# Patient Record
Sex: FEMALE | Race: WHITE | NOT HISPANIC OR LATINO | Employment: FULL TIME | URBAN - METROPOLITAN AREA
[De-identification: names, ages, dates, MRNs, and addresses within clinical notes are randomized per-mention and may not be internally consistent; named-entity substitution may affect disease eponyms.]

---

## 2021-01-27 ENCOUNTER — HISTORICAL (OUTPATIENT)
Dept: LAB | Facility: HOSPITAL | Age: 66
End: 2021-01-27

## 2022-04-07 ENCOUNTER — HISTORICAL (OUTPATIENT)
Dept: ADMINISTRATIVE | Facility: HOSPITAL | Age: 67
End: 2022-04-07
Payer: COMMERCIAL

## 2022-04-23 VITALS
DIASTOLIC BLOOD PRESSURE: 90 MMHG | OXYGEN SATURATION: 98 % | WEIGHT: 192.69 LBS | HEIGHT: 64 IN | BODY MASS INDEX: 32.9 KG/M2 | SYSTOLIC BLOOD PRESSURE: 138 MMHG

## 2022-05-11 ENCOUNTER — TELEPHONE (OUTPATIENT)
Dept: FAMILY MEDICINE | Facility: CLINIC | Age: 67
End: 2022-05-11
Payer: COMMERCIAL

## 2022-05-11 NOTE — TELEPHONE ENCOUNTER
----- Message from Evin Koroma sent at 5/10/2022  8:19 AM CDT -----  .Type:  Needs Medical Advice    Who Called: Shayy  Symptoms (please be specific):    How long has patient had these symptoms:    Pharmacy name and phone #:    Would the patient rather a call back or a response via [facility]?   Best Call Back Number: [phone]  Additional Information: she had a question about her blood work results, please call her to discuss.

## 2022-05-24 NOTE — TELEPHONE ENCOUNTER
Pt had labs on 4/8/22. Pulled them from [facility] and scanning them into chart for review. Will call pt with results after you review. Please send when done. Thanks.

## 2022-06-03 RX ORDER — METFORMIN HYDROCHLORIDE 500 MG/1
TABLET ORAL
Qty: 180 TABLET | Refills: 0 | Status: SHIPPED | OUTPATIENT
Start: 2022-06-03 | End: 2022-06-07

## 2022-06-07 RX ORDER — METFORMIN HYDROCHLORIDE 500 MG/1
TABLET ORAL
Qty: 180 TABLET | Refills: 0 | Status: SHIPPED | OUTPATIENT
Start: 2022-06-07 | End: 2022-12-29 | Stop reason: SDUPTHER

## 2022-06-09 ENCOUNTER — TELEPHONE (OUTPATIENT)
Dept: FAMILY MEDICINE | Facility: CLINIC | Age: 67
End: 2022-06-09
Payer: COMMERCIAL

## 2022-06-09 NOTE — TELEPHONE ENCOUNTER
Called and notified pt of results being normal. Left message to return call if any questions/concerns.

## 2022-06-09 NOTE — TELEPHONE ENCOUNTER
----- Message from Peggy Petra sent at 6/7/2022  9:08 AM CDT -----  Regarding: Test Results  Type:  Test Results    Who Called: Patient  Name of Test (Lab/Mammo/Etc): Labs  Date of Test: 04/08/22  Ordering Provider: Juan Luis  Where the test was performed: [facility]  Would the patient rather a call back or a response via [facility]? Call  Best Call Back Number: [phone]  Additional Information:  Patient states she has been waiting for the results over a month and would like a call to discuss results at number listed above.

## 2022-06-29 LAB
LEFT EYE DM RETINOPATHY: NEGATIVE
RIGHT EYE DM RETINOPATHY: NEGATIVE

## 2022-07-27 ENCOUNTER — TELEPHONE (OUTPATIENT)
Dept: FAMILY MEDICINE | Facility: CLINIC | Age: 67
End: 2022-07-27

## 2022-07-27 NOTE — TELEPHONE ENCOUNTER
----- Message from Srikanth Torres sent at 7/27/2022 12:50 PM CDT -----  Regarding: Appointment  Type:  Patient Call    Who Called: pt  Who Left Message for Patient: nurse  Does the patient know what this is regarding?: y  Would the patient rather a call back or a response via [facility]?  na  Best Call Back Number: [phone]  Additional Information:  requesting to cancel appointment at 330 due to death in family, rescheduled appointment for tomorrow, same time.

## 2022-07-28 ENCOUNTER — OFFICE VISIT (OUTPATIENT)
Dept: FAMILY MEDICINE | Facility: CLINIC | Age: 67
End: 2022-07-28
Payer: COMMERCIAL

## 2022-07-28 VITALS
OXYGEN SATURATION: 98 % | SYSTOLIC BLOOD PRESSURE: 121 MMHG | HEIGHT: 65 IN | TEMPERATURE: 98 F | HEART RATE: 79 BPM | BODY MASS INDEX: 29.66 KG/M2 | DIASTOLIC BLOOD PRESSURE: 82 MMHG | WEIGHT: 178 LBS | RESPIRATION RATE: 18 BRPM

## 2022-07-28 DIAGNOSIS — Z11.59 NEED FOR HEPATITIS C SCREENING TEST: ICD-10-CM

## 2022-07-28 DIAGNOSIS — E78.2 MIXED HYPERLIPIDEMIA: ICD-10-CM

## 2022-07-28 DIAGNOSIS — I10 PRIMARY HYPERTENSION: ICD-10-CM

## 2022-07-28 DIAGNOSIS — E11.65 TYPE 2 DIABETES MELLITUS WITH HYPERGLYCEMIA, WITHOUT LONG-TERM CURRENT USE OF INSULIN: ICD-10-CM

## 2022-07-28 DIAGNOSIS — R79.89 ELEVATED LIVER FUNCTION TESTS: ICD-10-CM

## 2022-07-28 DIAGNOSIS — Z01.818 PRE-OP EXAM: Primary | ICD-10-CM

## 2022-07-28 PROBLEM — Z97.3 WEARS EYEGLASSES: Status: ACTIVE | Noted: 2022-07-28

## 2022-07-28 PROBLEM — Z00.00 MEDICARE ANNUAL WELLNESS VISIT, SUBSEQUENT: Status: ACTIVE | Noted: 2022-07-28

## 2022-07-28 PROBLEM — E66.9 OBESITY: Status: ACTIVE | Noted: 2022-07-28

## 2022-07-28 PROBLEM — Z28.20 IMMUNIZATION NOT CARRIED OUT BECAUSE OF PATIENT DECISION: Status: ACTIVE | Noted: 2022-07-28

## 2022-07-28 PROBLEM — L40.9 PSORIASIS: Status: ACTIVE | Noted: 2022-07-28

## 2022-07-28 PROBLEM — Z53.20 OSTEOPOROSIS SCREENING DECLINED: Status: ACTIVE | Noted: 2022-07-28

## 2022-07-28 PROBLEM — F41.1 GENERALIZED ANXIETY DISORDER: Status: ACTIVE | Noted: 2022-07-28

## 2022-07-28 PROBLEM — Z23 ENCOUNTER FOR VACCINATION: Status: ACTIVE | Noted: 2022-07-28

## 2022-07-28 PROBLEM — E66.9 OBESITY WITH BODY MASS INDEX 30 OR GREATER: Status: ACTIVE | Noted: 2022-07-28

## 2022-07-28 PROCEDURE — 99214 OFFICE O/P EST MOD 30 MIN: CPT | Mod: ,,, | Performed by: FAMILY MEDICINE

## 2022-07-28 PROCEDURE — 99214 PR OFFICE/OUTPT VISIT, EST, LEVL IV, 30-39 MIN: ICD-10-PCS | Mod: ,,, | Performed by: FAMILY MEDICINE

## 2022-07-28 RX ORDER — ESTRADIOL 0.1 MG/G
CREAM VAGINAL
COMMUNITY
Start: 2022-03-21

## 2022-07-28 RX ORDER — IXEKIZUMAB 80 MG/ML
80 INJECTION, SOLUTION SUBCUTANEOUS
COMMUNITY
Start: 2022-07-15

## 2022-07-28 RX ORDER — SIMVASTATIN 20 MG/1
20 TABLET, FILM COATED ORAL NIGHTLY
COMMUNITY
Start: 2022-05-17 | End: 2023-03-29 | Stop reason: SDUPTHER

## 2022-07-28 RX ORDER — AMOXICILLIN 500 MG
1 CAPSULE ORAL DAILY
COMMUNITY

## 2022-07-28 RX ORDER — LORATADINE 10 MG/1
10 TABLET ORAL DAILY
COMMUNITY

## 2022-07-28 RX ORDER — METHYLPREDNISOLONE 4 MG/1
4 TABLET ORAL DAILY
COMMUNITY
End: 2023-03-29 | Stop reason: ALTCHOICE

## 2022-07-28 RX ORDER — MULTIVITAMIN
1 TABLET ORAL DAILY
COMMUNITY

## 2022-07-28 RX ORDER — LISINOPRIL 40 MG/1
40 TABLET ORAL DAILY
COMMUNITY
Start: 2022-05-15 | End: 2023-03-29 | Stop reason: SDUPTHER

## 2022-07-28 NOTE — PROGRESS NOTES
Subjective:      Patient ID: Shayy Burns is a 67 y.o. female.    Chief Complaint: Follow-up (Pt here for sx clearance for plastic surgery for face lift on 8/31/22. Appt with sx 8/16/22 and will need to have all pre-op orders done by then. )    Here for pre-operative medical clearance exam. Denies acute complaints.    Facelift is planned for mid August (~2-3 weeks). Pre-op labs and other screening requirements to be ordered by surgeon (as I understand it).      Problem List Items Addressed This Visit     Elevated liver function tests    Relevant Orders    Comprehensive Metabolic Panel    Hypertension    Relevant Orders    CBC Auto Differential    Comprehensive Metabolic Panel    Lipid Panel    Urinalysis, Reflex to Urine Culture Urine, Clean Catch    Hemoglobin A1C    MICROALBUMIN / CREATININE RATIO URINE    Mixed hyperlipidemia    Relevant Orders    Comprehensive Metabolic Panel    Lipid Panel    Type 2 diabetes mellitus with hyperglycemia    Relevant Orders    CBC Auto Differential    Comprehensive Metabolic Panel    Lipid Panel    Urinalysis, Reflex to Urine Culture Urine, Clean Catch    Hemoglobin A1C    MICROALBUMIN / CREATININE RATIO URINE      Other Visit Diagnoses     Pre-op exam    -  Primary    Need for hepatitis C screening test        Relevant Orders    Hepatitis C Antibody          The patient's Health Maintenance was reviewed and the following appears to be due:   Health Maintenance Due   Topic Date Due    Hepatitis C Screening  Never done    Lipid Panel  Never done    TETANUS VACCINE  Never done    Mammogram  Never done    DEXA Scan  Never done    Colorectal Cancer Screening  Never done    Shingles Vaccine (1 of 2) Never done    Pneumococcal Vaccines (Age 65+) (1 - PCV) Never done    COVID-19 Vaccine (3 - Booster for Pfizer series) 03/08/2022       Past Medical History:  Past Medical History:   Diagnosis Date    Allergy     Diabetes mellitus, type 2     Hyperlipidemia     Hypertension   "   Psoriasis      Past Surgical History:   Procedure Laterality Date     SECTION      HYSTERECTOMY       Review of patient's allergies indicates:   Allergen Reactions    Bupropion hcl Rash     Current Outpatient Medications on File Prior to Visit   Medication Sig Dispense Refill    estradioL (ESTRACE) 0.01 % (0.1 mg/gram) vaginal cream INSERT 1 GRAM VAGINALLY TWICE A WEEK      lisinopriL (PRINIVIL,ZESTRIL) 40 MG tablet Take 40 mg by mouth once daily.      loratadine (CLARITIN) 10 mg tablet Take 10 mg by mouth once daily.      metFORMIN (GLUCOPHAGE) 500 MG tablet Take 1 tablet by mouth twice daily 180 tablet 0    methylPREDNISolone (MEDROL DOSEPACK) 4 mg tablet Take 4 mg by mouth once daily. use as directed      multivitamin (ONE DAILY MULTIVITAMIN) per tablet Take 1 tablet by mouth once daily.      omega-3 fatty acids/fish oil (FISH OIL-OMEGA-3 FATTY ACIDS) 300-1,000 mg capsule Take 1 capsule by mouth once daily.      simvastatin (ZOCOR) 20 MG tablet Take 20 mg by mouth nightly.      TALTZ AUTOINJECTOR 80 mg/mL AtIn Inject 80 mg into the skin every 28 days.       No current facility-administered medications on file prior to visit.     Social History     Socioeconomic History    Marital status:     Number of children: 2   Occupational History    Occupation:     Tobacco Use    Smoking status: Never Smoker    Smokeless tobacco: Never Used   Substance and Sexual Activity    Alcohol use: Yes     Comment: 5-7 drinks per week     Drug use: Never     Family History   Problem Relation Age of Onset    Cancer Mother     Heart disease Mother     Parkinsonism Father        Review of Systems   All other systems reviewed and are negative.      Objective:   /82 (BP Location: Right arm, Patient Position: Sitting, BP Method: Large (Automatic))   Pulse 79   Temp 97.9 °F (36.6 °C) (Temporal)   Resp 18   Ht 5' 5" (1.651 m)   Wt 80.7 kg (178 lb)   SpO2 98%   BMI 29.62 kg/m² "     Physical Exam  Vitals and nursing note reviewed.   Constitutional:       Appearance: Normal appearance. She is overweight.   HENT:      Head: Normocephalic and atraumatic.      Right Ear: Tympanic membrane, ear canal and external ear normal.      Left Ear: Tympanic membrane, ear canal and external ear normal.      Nose: Nose normal.      Mouth/Throat:      Mouth: Mucous membranes are moist.      Pharynx: Oropharynx is clear.   Eyes:      Extraocular Movements: Extraocular movements intact.      Conjunctiva/sclera: Conjunctivae normal.      Pupils: Pupils are equal, round, and reactive to light.   Cardiovascular:      Rate and Rhythm: Normal rate and regular rhythm.      Pulses: Normal pulses.      Heart sounds: Normal heart sounds.   Pulmonary:      Effort: Pulmonary effort is normal.      Breath sounds: Normal breath sounds.   Abdominal:      General: Abdomen is flat. Bowel sounds are normal.      Palpations: Abdomen is soft.   Musculoskeletal:         General: Normal range of motion.      Cervical back: Normal range of motion and neck supple.   Skin:     General: Skin is warm and dry.      Capillary Refill: Capillary refill takes 2 to 3 seconds.   Neurological:      General: No focal deficit present.      Mental Status: She is alert and oriented to person, place, and time. Mental status is at baseline.   Psychiatric:         Mood and Affect: Mood normal.         Behavior: Behavior normal.         Thought Content: Thought content normal.         Judgment: Judgment normal.         No visits with results within 6 Month(s) from this visit.   Latest known visit with results is:   No results found for any previous visit.       No image results found.       Assessment:     1. Pre-op exam    2. Type 2 diabetes mellitus with hyperglycemia, without long-term current use of insulin    3. Primary hypertension    4. Mixed hyperlipidemia    5. Elevated liver function tests    6. Need for hepatitis C screening test       Plan:   I am having Shayy Burns maintain her metFORMIN, lisinopriL, simvastatin, estradioL, TALTZ AUTOINJECTOR, loratadine, multivitamin, fish oil-omega-3 fatty acids, and methylPREDNISolone.  Problem List Items Addressed This Visit     Elevated liver function tests    Relevant Orders    Comprehensive Metabolic Panel    Hypertension    Relevant Orders    CBC Auto Differential    Comprehensive Metabolic Panel    Lipid Panel    Urinalysis, Reflex to Urine Culture Urine, Clean Catch    Hemoglobin A1C    MICROALBUMIN / CREATININE RATIO URINE    Mixed hyperlipidemia    Relevant Orders    Comprehensive Metabolic Panel    Lipid Panel    Type 2 diabetes mellitus with hyperglycemia    Relevant Orders    CBC Auto Differential    Comprehensive Metabolic Panel    Lipid Panel    Urinalysis, Reflex to Urine Culture Urine, Clean Catch    Hemoglobin A1C    MICROALBUMIN / CREATININE RATIO URINE      Other Visit Diagnoses     Pre-op exam    -  Primary    Need for hepatitis C screening test        Relevant Orders    Hepatitis C Antibody        Follow up for mammogram report request; DXA request & pap report (Dr. Hunter); colon cancer screen results.    Shayy was seen today for follow-up.    Diagnoses and all orders for this visit:    Pre-op exam    Regarding the purpose for this visit, She is medically stable and cleared to proceed with planned operation without impediment. (Plastic Surgery - face).        The Follow-ing diagnoses are here placed to effect labs for the upcoming surveillance visit in September. Orders were not carried over from [facility], and so are being placed here to better prepare for that visit.    Type 2 diabetes mellitus with hyperglycemia, without long-term current use of insulin  -     CBC Auto Differential; Future  -     Comprehensive Metabolic Panel; Future  -     Lipid Panel; Future  -     Urinalysis, Reflex to Urine Culture Urine, Clean Catch; Future  -     Hemoglobin A1C; Future  -     MICROALBUMIN /  CREATININE RATIO URINE; Future  -     CBC Auto Differential  -     Comprehensive Metabolic Panel  -     Lipid Panel  -     Urinalysis, Reflex to Urine Culture Urine, Clean Catch  -     Hemoglobin A1C  -     MICROALBUMIN / CREATININE RATIO URINE    Primary hypertension  -     CBC Auto Differential; Future  -     Comprehensive Metabolic Panel; Future  -     Lipid Panel; Future  -     Urinalysis, Reflex to Urine Culture Urine, Clean Catch; Future  -     Hemoglobin A1C; Future  -     MICROALBUMIN / CREATININE RATIO URINE; Future  -     CBC Auto Differential  -     Comprehensive Metabolic Panel  -     Lipid Panel  -     Urinalysis, Reflex to Urine Culture Urine, Clean Catch  -     Hemoglobin A1C  -     MICROALBUMIN / CREATININE RATIO URINE    Mixed hyperlipidemia  -     Comprehensive Metabolic Panel; Future  -     Lipid Panel; Future  -     Comprehensive Metabolic Panel  -     Lipid Panel    Elevated liver function tests  -     Comprehensive Metabolic Panel; Future  -     Comprehensive Metabolic Panel    Need for hepatitis C screening test  -     Hepatitis C Antibody; Future  -     Hepatitis C Antibody         [unfilled]  Orders Placed This Encounter   Procedures    CBC Auto Differential     Standing Status:   Future     Number of Occurrences:   1     Standing Expiration Date:   11/1/2022    Comprehensive Metabolic Panel     Standing Status:   Future     Number of Occurrences:   1     Standing Expiration Date:   11/1/2022    Lipid Panel     Standing Status:   Future     Number of Occurrences:   1     Standing Expiration Date:   11/1/2022    Urinalysis, Reflex to Urine Culture Urine, Clean Catch     Standing Status:   Future     Number of Occurrences:   1     Standing Expiration Date:   11/1/2022     Order Specific Question:   Preferred Collection Type     Answer:   Urine, Clean Catch     Order Specific Question:   Specimen Source     Answer:   Urine    Hemoglobin A1C     Standing Status:   Future     Number of  Occurrences:   1     Standing Expiration Date:   11/1/2022    MICROALBUMIN / CREATININE RATIO URINE     Standing Status:   Future     Number of Occurrences:   1     Standing Expiration Date:   11/1/2022     Order Specific Question:   Specimen Source     Answer:   Urine    Hepatitis C Antibody     Standing Status:   Future     Number of Occurrences:   1     Standing Expiration Date:   11/1/2022     Order Specific Question:   Release to patient     Answer:   Immediate       Medication List with Changes/Refills   Current Medications    ESTRADIOL (ESTRACE) 0.01 % (0.1 MG/GRAM) VAGINAL CREAM    INSERT 1 GRAM VAGINALLY TWICE A WEEK    LISINOPRIL (PRINIVIL,ZESTRIL) 40 MG TABLET    Take 40 mg by mouth once daily.    LORATADINE (CLARITIN) 10 MG TABLET    Take 10 mg by mouth once daily.    METFORMIN (GLUCOPHAGE) 500 MG TABLET    Take 1 tablet by mouth twice daily    METHYLPREDNISOLONE (MEDROL DOSEPACK) 4 MG TABLET    Take 4 mg by mouth once daily. use as directed    MULTIVITAMIN (ONE DAILY MULTIVITAMIN) PER TABLET    Take 1 tablet by mouth once daily.    OMEGA-3 FATTY ACIDS/FISH OIL (FISH OIL-OMEGA-3 FATTY ACIDS) 300-1,000 MG CAPSULE    Take 1 capsule by mouth once daily.    SIMVASTATIN (ZOCOR) 20 MG TABLET    Take 20 mg by mouth nightly.    TALTZ AUTOINJECTOR 80 MG/ML ATIN    Inject 80 mg into the skin every 28 days.      Medication List with Changes/Refills   Current Medications    ESTRADIOL (ESTRACE) 0.01 % (0.1 MG/GRAM) VAGINAL CREAM    INSERT 1 GRAM VAGINALLY TWICE A WEEK       Start Date: 3/21/2022 End Date: --    LISINOPRIL (PRINIVIL,ZESTRIL) 40 MG TABLET    Take 40 mg by mouth once daily.       Start Date: 5/15/2022 End Date: --    LORATADINE (CLARITIN) 10 MG TABLET    Take 10 mg by mouth once daily.       Start Date: --        End Date: --    METFORMIN (GLUCOPHAGE) 500 MG TABLET    Take 1 tablet by mouth twice daily       Start Date: 6/7/2022  End Date: --    METHYLPREDNISOLONE (MEDROL DOSEPACK) 4 MG TABLET    Take  4 mg by mouth once daily. use as directed       Start Date: --        End Date: --    MULTIVITAMIN (ONE DAILY MULTIVITAMIN) PER TABLET    Take 1 tablet by mouth once daily.       Start Date: --        End Date: --    OMEGA-3 FATTY ACIDS/FISH OIL (FISH OIL-OMEGA-3 FATTY ACIDS) 300-1,000 MG CAPSULE    Take 1 capsule by mouth once daily.       Start Date: --        End Date: --    SIMVASTATIN (ZOCOR) 20 MG TABLET    Take 20 mg by mouth nightly.       Start Date: 5/17/2022 End Date: --    TALTZ AUTOINJECTOR 80 MG/ML ATIN    Inject 80 mg into the skin every 28 days.       Start Date: 7/15/2022 End Date: --

## 2022-08-02 ENCOUNTER — DOCUMENTATION ONLY (OUTPATIENT)
Dept: ADMINISTRATIVE | Facility: HOSPITAL | Age: 67
End: 2022-08-02
Payer: COMMERCIAL

## 2022-08-08 ENCOUNTER — CLINICAL SUPPORT (OUTPATIENT)
Dept: RESPIRATORY THERAPY | Facility: HOSPITAL | Age: 67
End: 2022-08-08
Attending: FAMILY MEDICINE
Payer: COMMERCIAL

## 2022-08-08 DIAGNOSIS — I10 PRIMARY HYPERTENSION: ICD-10-CM

## 2022-08-08 DIAGNOSIS — Z01.818 PRE-OP EXAM: ICD-10-CM

## 2022-08-08 PROCEDURE — 93005 ELECTROCARDIOGRAM TRACING: CPT

## 2022-08-08 PROCEDURE — 93010 EKG 12-LEAD: ICD-10-PCS | Mod: ,,, | Performed by: INTERNAL MEDICINE

## 2022-08-08 PROCEDURE — 93010 ELECTROCARDIOGRAM REPORT: CPT | Mod: ,,, | Performed by: INTERNAL MEDICINE

## 2022-09-15 LAB
CHOLEST SERPL-MSCNC: 134 MG/DL (ref 0–200)
HBA1C MFR BLD: 6.2 % (ref 4–6)
HDLC SERPL-MCNC: 46 MG/DL (ref 35–70)
LDLC SERPL CALC-MCNC: 66 MG/DL
TRIGL SERPL-MCNC: 138 MG/DL (ref 40–160)

## 2022-09-22 ENCOUNTER — DOCUMENTATION ONLY (OUTPATIENT)
Dept: FAMILY MEDICINE | Facility: CLINIC | Age: 67
End: 2022-09-22
Payer: COMMERCIAL

## 2022-09-23 ENCOUNTER — DOCUMENTATION ONLY (OUTPATIENT)
Dept: FAMILY MEDICINE | Facility: CLINIC | Age: 67
End: 2022-09-23
Payer: COMMERCIAL

## 2022-09-26 ENCOUNTER — OFFICE VISIT (OUTPATIENT)
Dept: FAMILY MEDICINE | Facility: CLINIC | Age: 67
End: 2022-09-26
Payer: COMMERCIAL

## 2022-09-26 VITALS
HEIGHT: 65 IN | HEART RATE: 85 BPM | DIASTOLIC BLOOD PRESSURE: 76 MMHG | WEIGHT: 174.81 LBS | RESPIRATION RATE: 18 BRPM | BODY MASS INDEX: 29.12 KG/M2 | SYSTOLIC BLOOD PRESSURE: 129 MMHG

## 2022-09-26 DIAGNOSIS — Z23 NEED FOR VACCINATION: ICD-10-CM

## 2022-09-26 DIAGNOSIS — E66.3 OVERWEIGHT: Chronic | ICD-10-CM

## 2022-09-26 DIAGNOSIS — E78.2 MIXED HYPERLIPIDEMIA DUE TO TYPE 2 DIABETES MELLITUS: Chronic | ICD-10-CM

## 2022-09-26 DIAGNOSIS — E78.2 MIXED HYPERLIPIDEMIA: Chronic | ICD-10-CM

## 2022-09-26 DIAGNOSIS — Z13.820 OSTEOPOROSIS SCREENING: Chronic | ICD-10-CM

## 2022-09-26 DIAGNOSIS — E11.59 HYPERTENSION ASSOCIATED WITH TYPE 2 DIABETES MELLITUS: Chronic | ICD-10-CM

## 2022-09-26 DIAGNOSIS — I10 PRIMARY HYPERTENSION: Primary | Chronic | ICD-10-CM

## 2022-09-26 DIAGNOSIS — Z12.11 COLON CANCER SCREENING: Chronic | ICD-10-CM

## 2022-09-26 DIAGNOSIS — Z12.31 ENCOUNTER FOR SCREENING MAMMOGRAM FOR MALIGNANT NEOPLASM OF BREAST: Chronic | ICD-10-CM

## 2022-09-26 DIAGNOSIS — Z28.20 IMMUNIZATION NOT CARRIED OUT BECAUSE OF PATIENT DECISION: ICD-10-CM

## 2022-09-26 DIAGNOSIS — E11.69 MIXED HYPERLIPIDEMIA DUE TO TYPE 2 DIABETES MELLITUS: Chronic | ICD-10-CM

## 2022-09-26 DIAGNOSIS — E11.65 TYPE 2 DIABETES MELLITUS WITH HYPERGLYCEMIA, WITHOUT LONG-TERM CURRENT USE OF INSULIN: ICD-10-CM

## 2022-09-26 DIAGNOSIS — I15.2 HYPERTENSION ASSOCIATED WITH TYPE 2 DIABETES MELLITUS: Chronic | ICD-10-CM

## 2022-09-26 PROBLEM — Z12.39 BREAST CANCER SCREENING: Chronic | Status: ACTIVE | Noted: 2022-09-26

## 2022-09-26 PROCEDURE — 99214 OFFICE O/P EST MOD 30 MIN: CPT | Mod: ,,, | Performed by: FAMILY MEDICINE

## 2022-09-26 PROCEDURE — 99214 PR OFFICE/OUTPT VISIT, EST, LEVL IV, 30-39 MIN: ICD-10-PCS | Mod: ,,, | Performed by: FAMILY MEDICINE

## 2022-09-26 NOTE — PROGRESS NOTES
Subjective:      Patient ID: Shayy Burns is a 67 y.o. female.    Chief Complaint: Follow-up (6 month f/u/)    6 month surveillance visit without acute complaints.    Chronic conditions as discussed below      Problem List Items Addressed This Visit       Hypertension - Primary (Chronic)    Mixed hyperlipidemia (Chronic)    BMI 29.0-29.9,adult (Chronic)    Overweight (Chronic)    Type 2 diabetes mellitus with hyperglycemia (Chronic)    Hypertension associated with type 2 diabetes mellitus (Chronic)    Mixed hyperlipidemia due to type 2 diabetes mellitus (Chronic)    Breast cancer screening (Chronic)    Osteoporosis screening (Chronic)    Colon cancer screening (Chronic)    Relevant Orders    Cologuard Screening (Multitarget Stool DNA)    Need for vaccination    Immunization not carried out because of patient decision       The patient's Health Maintenance was reviewed and the following appears to be due:   Health Maintenance Due   Topic Date Due    Hepatitis C Screening  Never done    Lipid Panel  Never done    TETANUS VACCINE  Never done    Mammogram  Never done    DEXA Scan  Never done    Colorectal Cancer Screening  Never done    Shingles Vaccine (1 of 2) Never done    Pneumococcal Vaccines (Age 65+) (1 - PCV) Never done    COVID-19 Vaccine (3 - Booster for Pfizer series) 2021    Influenza Vaccine (1) Never done       Past Medical History:  Past Medical History:   Diagnosis Date    Allergy     Diabetes mellitus, type 2     Hyperlipidemia     Hypertension     Psoriasis      Past Surgical History:   Procedure Laterality Date     SECTION      HYSTERECTOMY       Review of patient's allergies indicates:   Allergen Reactions    Bupropion hcl Rash     Current Outpatient Medications on File Prior to Visit   Medication Sig Dispense Refill    estradioL (ESTRACE) 0.01 % (0.1 mg/gram) vaginal cream INSERT 1 GRAM VAGINALLY TWICE A WEEK      lisinopriL (PRINIVIL,ZESTRIL) 40 MG tablet Take 40 mg by mouth once  "daily.      loratadine (CLARITIN) 10 mg tablet Take 10 mg by mouth once daily.      metFORMIN (GLUCOPHAGE) 500 MG tablet Take 1 tablet by mouth twice daily 180 tablet 0    methylPREDNISolone (MEDROL DOSEPACK) 4 mg tablet Take 4 mg by mouth once daily. use as directed      multivitamin (THERAGRAN) per tablet Take 1 tablet by mouth once daily.      omega-3 fatty acids/fish oil (FISH OIL-OMEGA-3 FATTY ACIDS) 300-1,000 mg capsule Take 1 capsule by mouth once daily.      simvastatin (ZOCOR) 20 MG tablet Take 20 mg by mouth nightly.      TALTZ AUTOINJECTOR 80 mg/mL AtIn Inject 80 mg into the skin every 28 days.       No current facility-administered medications on file prior to visit.     Social History     Socioeconomic History    Marital status:     Number of children: 2   Occupational History    Occupation:     Tobacco Use    Smoking status: Never    Smokeless tobacco: Never   Substance and Sexual Activity    Alcohol use: Yes     Comment: 5-7 drinks per week     Drug use: Never    Sexual activity: Never     Family History   Problem Relation Age of Onset    Cancer Mother     Heart disease Mother     Parkinsonism Father        Review of Systems   All other systems reviewed and are negative.    Objective:   /76 (BP Location: Right arm, Patient Position: Sitting, BP Method: Small (Automatic))   Pulse 85   Resp 18   Ht 5' 5" (1.651 m)   Wt 79.3 kg (174 lb 12.8 oz)   BMI 29.09 kg/m²     Physical Exam  Vitals and nursing note reviewed.   Constitutional:       General: She is awake.      Appearance: Normal appearance. She is well-developed, well-groomed and overweight.   HENT:      Head: Normocephalic and atraumatic.      Right Ear: Tympanic membrane, ear canal and external ear normal.      Left Ear: Tympanic membrane, ear canal and external ear normal.      Nose: Nose normal.      Mouth/Throat:      Mouth: Mucous membranes are moist.      Pharynx: Oropharynx is clear.   Eyes:      Extraocular " Movements: Extraocular movements intact.      Conjunctiva/sclera: Conjunctivae normal.      Pupils: Pupils are equal, round, and reactive to light.   Cardiovascular:      Rate and Rhythm: Normal rate and regular rhythm.      Pulses: Normal pulses.      Heart sounds: Normal heart sounds.   Pulmonary:      Effort: Pulmonary effort is normal.      Breath sounds: Normal breath sounds.   Abdominal:      General: Abdomen is flat. Bowel sounds are normal.      Palpations: Abdomen is soft.   Musculoskeletal:         General: Normal range of motion.      Cervical back: Normal range of motion and neck supple.   Skin:     General: Skin is warm and dry.      Capillary Refill: Capillary refill takes less than 2 seconds.   Neurological:      General: No focal deficit present.      Mental Status: She is alert and oriented to person, place, and time. Mental status is at baseline.   Psychiatric:         Mood and Affect: Mood normal.         Behavior: Behavior normal. Behavior is cooperative.         Thought Content: Thought content normal.         Judgment: Judgment normal.       Documentation Only on 08/02/2022   Component Date Value Ref Range Status    Left Eye DM Retinopathy 06/29/2022 Negative   Final    Right Eye DM Retinopathy 06/29/2022 Negative   Final       No image results found.       Assessment:     1. Primary hypertension    2. Mixed hyperlipidemia    3. Type 2 diabetes mellitus with hyperglycemia, without long-term current use of insulin    4. Hypertension associated with type 2 diabetes mellitus    5. Mixed hyperlipidemia due to type 2 diabetes mellitus    6. Need for vaccination    7. Immunization not carried out because of patient decision    8. BMI 29.0-29.9,adult    9. Overweight    10. Encounter for screening mammogram for malignant neoplasm of breast    11. Osteoporosis screening    12. Colon cancer screening      Plan:   I am having Shayy Burns maintain her metFORMIN, lisinopriL, simvastatin, estradioL, TALTZ  AUTOINJECTOR, loratadine, multivitamin, fish oil-omega-3 fatty acids, and methylPREDNISolone.  Problem List Items Addressed This Visit       Hypertension - Primary (Chronic)    Mixed hyperlipidemia (Chronic)    BMI 29.0-29.9,adult (Chronic)    Overweight (Chronic)    Type 2 diabetes mellitus with hyperglycemia (Chronic)    Hypertension associated with type 2 diabetes mellitus (Chronic)    Mixed hyperlipidemia due to type 2 diabetes mellitus (Chronic)    Breast cancer screening (Chronic)    Osteoporosis screening (Chronic)    Colon cancer screening (Chronic)    Relevant Orders    Cologuard Screening (Multitarget Stool DNA)    Need for vaccination    Immunization not carried out because of patient decision     Follow up in about 6 months (around 3/26/2023) for DEXA and Mammogram report.    Shayy was seen today for follow-up.  Labs (in media tab) from outside source reviewed in visit.   Refills will be needed soon, not exactly today  Diagnoses and all orders for this visit:    Primary hypertension   Continue current prescription medications. Refills as needed   Condition/Symptoms controlled   RTC 6 months (as scheduled) or PRN    Mixed hyperlipidemia   Continue current prescription medications. Refills as needed   Condition/Symptoms controlled   RTC 6 months (as scheduled) or PRN    Type 2 diabetes mellitus with hyperglycemia, without long-term current use of insulin   Continue current prescription medications. Refills as needed   Condition/Symptoms controlled   RTC 6 months (as scheduled) or PRN    Hypertension associated with type 2 diabetes mellitus   Continue current prescription medications. Refills as needed   Condition/Symptoms controlled   RTC 6 months (as scheduled) or PRN    Mixed hyperlipidemia due to type 2 diabetes mellitus   Continue current prescription medications. Refills as needed   Condition/Symptoms controlled   RTC 6 months (as scheduled) or PRN    Need for vaccination  Immunization not carried  out because of patient decision  Documented for chart completeness and [facility]    BMI 29.0-29.9,adult  Overweight  Documented for chart completeness and [facility]    Encounter for screening mammogram for malignant neoplasm of breast   Osteoporosis screening   Request records    Colon cancer screening  -     Cologuard Screening (Multitarget Stool DNA); Future  -     Cologuard Screening (Multitarget Stool DNA)         [unfilled]  Orders Placed This Encounter   Procedures    Cologuard Screening (Multitarget Stool DNA)     Standing Status:   Future     Number of Occurrences:   1     Standing Expiration Date:   11/25/2023       Medication List with Changes/Refills   Current Medications    ESTRADIOL (ESTRACE) 0.01 % (0.1 MG/GRAM) VAGINAL CREAM    INSERT 1 GRAM VAGINALLY TWICE A WEEK    LISINOPRIL (PRINIVIL,ZESTRIL) 40 MG TABLET    Take 40 mg by mouth once daily.    LORATADINE (CLARITIN) 10 MG TABLET    Take 10 mg by mouth once daily.    METFORMIN (GLUCOPHAGE) 500 MG TABLET    Take 1 tablet by mouth twice daily    METHYLPREDNISOLONE (MEDROL DOSEPACK) 4 MG TABLET    Take 4 mg by mouth once daily. use as directed    MULTIVITAMIN (THERAGRAN) PER TABLET    Take 1 tablet by mouth once daily.    OMEGA-3 FATTY ACIDS/FISH OIL (FISH OIL-OMEGA-3 FATTY ACIDS) 300-1,000 MG CAPSULE    Take 1 capsule by mouth once daily.    SIMVASTATIN (ZOCOR) 20 MG TABLET    Take 20 mg by mouth nightly.    TALTZ AUTOINJECTOR 80 MG/ML ATIN    Inject 80 mg into the skin every 28 days.      Medication List with Changes/Refills   Current Medications    ESTRADIOL (ESTRACE) 0.01 % (0.1 MG/GRAM) VAGINAL CREAM    INSERT 1 GRAM VAGINALLY TWICE A WEEK       Start Date: 3/21/2022 End Date: --    LISINOPRIL (PRINIVIL,ZESTRIL) 40 MG TABLET    Take 40 mg by mouth once daily.       Start Date: 5/15/2022 End Date: --    LORATADINE (CLARITIN) 10 MG TABLET    Take 10 mg by mouth once daily.       Start Date: --        End Date: --    METFORMIN (GLUCOPHAGE) 500 MG TABLET    Take 1  tablet by mouth twice daily       Start Date: 6/7/2022  End Date: --    METHYLPREDNISOLONE (MEDROL DOSEPACK) 4 MG TABLET    Take 4 mg by mouth once daily. use as directed       Start Date: --        End Date: --    MULTIVITAMIN (THERAGRAN) PER TABLET    Take 1 tablet by mouth once daily.       Start Date: --        End Date: --    OMEGA-3 FATTY ACIDS/FISH OIL (FISH OIL-OMEGA-3 FATTY ACIDS) 300-1,000 MG CAPSULE    Take 1 capsule by mouth once daily.       Start Date: --        End Date: --    SIMVASTATIN (ZOCOR) 20 MG TABLET    Take 20 mg by mouth nightly.       Start Date: 5/17/2022 End Date: --    TALTZ AUTOINJECTOR 80 MG/ML ATIN    Inject 80 mg into the skin every 28 days.       Start Date: 7/15/2022 End Date: --

## 2022-10-11 ENCOUNTER — DOCUMENTATION ONLY (OUTPATIENT)
Dept: ADMINISTRATIVE | Facility: HOSPITAL | Age: 67
End: 2022-10-11
Payer: COMMERCIAL

## 2022-10-31 PROBLEM — Z00.00 MEDICARE ANNUAL WELLNESS VISIT, SUBSEQUENT: Status: RESOLVED | Noted: 2022-07-28 | Resolved: 2022-10-31

## 2022-12-26 PROBLEM — Z13.820 OSTEOPOROSIS SCREENING: Chronic | Status: RESOLVED | Noted: 2022-09-26 | Resolved: 2022-12-26

## 2023-02-27 ENCOUNTER — TELEPHONE (OUTPATIENT)
Dept: FAMILY MEDICINE | Facility: CLINIC | Age: 68
End: 2023-02-27

## 2023-02-27 DIAGNOSIS — I10 PRIMARY HYPERTENSION: Primary | Chronic | ICD-10-CM

## 2023-02-27 DIAGNOSIS — E78.2 MIXED HYPERLIPIDEMIA: Chronic | ICD-10-CM

## 2023-02-27 DIAGNOSIS — E11.65 TYPE 2 DIABETES MELLITUS WITH HYPERGLYCEMIA, WITHOUT LONG-TERM CURRENT USE OF INSULIN: Chronic | ICD-10-CM

## 2023-02-27 NOTE — TELEPHONE ENCOUNTER
----- Message from Sofía Darden sent at 2/27/2023 10:34 AM CST -----  Regarding: Lab orders  Shayy stated she will come to office to get her lab orders.  She will be doing her labs tomorrow for her 03/29/23 appt.  Did confirm with office that tomorrow will be within the 30 day period. She is requesting(if possible) can we print them now and have ready for when she get to the office.  She will pick them up. Thanks

## 2023-03-01 ENCOUNTER — DOCUMENTATION ONLY (OUTPATIENT)
Dept: FAMILY MEDICINE | Facility: CLINIC | Age: 68
End: 2023-03-01
Payer: MEDICAID

## 2023-03-01 LAB
ALBUMIN SERPL-MCNC: 4.4 G/DL (ref 3.6–5.1)
ALBUMIN/GLOB SERPL: 2 (CALC) (ref 1–2.5)
ALP SERPL-CCNC: 42 U/L (ref 37–153)
ALT SERPL-CCNC: 28 U/L (ref 6–29)
AST SERPL-CCNC: 21 U/L (ref 10–35)
BASOPHILS # BLD AUTO: 40 CELLS/UL (ref 0–200)
BASOPHILS NFR BLD AUTO: 0.7 %
BILIRUB SERPL-MCNC: 0.9 MG/DL (ref 0.2–1.2)
BUN SERPL-MCNC: 16 MG/DL (ref 7–25)
BUN/CREAT SERPL: ABNORMAL (CALC) (ref 6–22)
CALCIUM SERPL-MCNC: 9.8 MG/DL (ref 8.6–10.4)
CHLORIDE SERPL-SCNC: 101 MMOL/L (ref 98–110)
CHOLEST SERPL-MCNC: 151 MG/DL
CHOLEST SERPL-MSCNC: 151 MG/DL (ref 0–200)
CHOLEST/HDLC SERPL: 2.6 (CALC)
CO2 SERPL-SCNC: 26 MMOL/L (ref 20–32)
CREAT SERPL-MCNC: 0.62 MG/DL (ref 0.5–1.05)
EGFR: 98 ML/MIN/1.73M2
EOSINOPHIL # BLD AUTO: 51 CELLS/UL (ref 15–500)
EOSINOPHIL NFR BLD AUTO: 0.9 %
ERYTHROCYTE [DISTWIDTH] IN BLOOD BY AUTOMATED COUNT: 12.8 % (ref 11–15)
GLOBULIN SER CALC-MCNC: 2.2 G/DL (CALC) (ref 1.9–3.7)
GLUCOSE SERPL-MCNC: 109 MG/DL (ref 65–99)
HBA1C MFR BLD: 5.8 % OF TOTAL HGB
HCT VFR BLD AUTO: 48 % (ref 35–45)
HDLC SERPL-MCNC: 58 MG/DL
HDLC SERPL-MCNC: 58 MG/DL (ref 35–70)
HGB BLD-MCNC: 15.8 G/DL (ref 11.7–15.5)
LDLC SERPL CALC-MCNC: 72 MG/DL (CALC)
LDLC SERPL CALC-MCNC: 72 MG/DL (ref 0–160)
LYMPHOCYTES # BLD AUTO: 2212 CELLS/UL (ref 850–3900)
LYMPHOCYTES NFR BLD AUTO: 38.8 %
MCH RBC QN AUTO: 29.9 PG (ref 27–33)
MCHC RBC AUTO-ENTMCNC: 32.9 G/DL (ref 32–36)
MCV RBC AUTO: 90.9 FL (ref 80–100)
MONOCYTES # BLD AUTO: 428 CELLS/UL (ref 200–950)
MONOCYTES NFR BLD AUTO: 7.5 %
NEUTROPHILS # BLD AUTO: 2970 CELLS/UL (ref 1500–7800)
NEUTROPHILS NFR BLD AUTO: 52.1 %
NONHDLC SERPL-MCNC: 93 MG/DL (CALC)
PLATELET # BLD AUTO: 197 THOUSAND/UL (ref 140–400)
PMV BLD REES-ECKER: 11.7 FL (ref 7.5–12.5)
POTASSIUM SERPL-SCNC: 4.3 MMOL/L (ref 3.5–5.3)
PROT SERPL-MCNC: 6.6 G/DL (ref 6.1–8.1)
RBC # BLD AUTO: 5.28 MILLION/UL (ref 3.8–5.1)
SODIUM SERPL-SCNC: 139 MMOL/L (ref 135–146)
TRIGL SERPL-MCNC: 127 MG/DL
TRIGL SERPL-MCNC: 127 MG/DL (ref 40–160)
WBC # BLD AUTO: 5.7 THOUSAND/UL (ref 3.8–10.8)

## 2023-03-02 NOTE — PROGRESS NOTES
Labs generally improved. Next visit seems a bit distant from these results, but can review in detail at that time -- JA

## 2023-03-11 LAB
APPEARANCE UR: CLEAR
BACTERIA #/AREA URNS HPF: ABNORMAL /HPF
BILIRUB UR QL STRIP: NEGATIVE
COLOR UR: YELLOW
GLUCOSE UR QL STRIP: NEGATIVE
HGB UR QL STRIP: NEGATIVE
HYALINE CASTS #/AREA URNS LPF: ABNORMAL /LPF
KETONES UR QL STRIP: NEGATIVE
LEUKOCYTE ESTERASE UR QL STRIP: ABNORMAL
NITRITE UR QL STRIP: NEGATIVE
PH UR STRIP: 7 [PH] (ref 5–8)
PROT UR QL STRIP: NEGATIVE
RBC #/AREA URNS HPF: ABNORMAL /HPF
SERVICE CMNT-IMP: ABNORMAL
SP GR UR STRIP: 1.01 (ref 1–1.03)
SQUAMOUS #/AREA URNS HPF: ABNORMAL /HPF
WBC #/AREA URNS HPF: ABNORMAL /HPF

## 2023-03-29 ENCOUNTER — OFFICE VISIT (OUTPATIENT)
Dept: FAMILY MEDICINE | Facility: CLINIC | Age: 68
End: 2023-03-29
Payer: MEDICARE

## 2023-03-29 VITALS
DIASTOLIC BLOOD PRESSURE: 74 MMHG | HEART RATE: 95 BPM | SYSTOLIC BLOOD PRESSURE: 118 MMHG | RESPIRATION RATE: 18 BRPM | BODY MASS INDEX: 29.09 KG/M2 | OXYGEN SATURATION: 98 % | HEIGHT: 65 IN | TEMPERATURE: 98 F

## 2023-03-29 DIAGNOSIS — I10 PRIMARY HYPERTENSION: Primary | Chronic | ICD-10-CM

## 2023-03-29 DIAGNOSIS — E11.59 HYPERTENSION ASSOCIATED WITH TYPE 2 DIABETES MELLITUS: Chronic | ICD-10-CM

## 2023-03-29 DIAGNOSIS — Z79.890 HORMONE REPLACEMENT THERAPY (HRT): ICD-10-CM

## 2023-03-29 DIAGNOSIS — E11.65 TYPE 2 DIABETES MELLITUS WITH HYPERGLYCEMIA, WITHOUT LONG-TERM CURRENT USE OF INSULIN: Chronic | ICD-10-CM

## 2023-03-29 DIAGNOSIS — E78.2 MIXED HYPERLIPIDEMIA DUE TO TYPE 2 DIABETES MELLITUS: Chronic | ICD-10-CM

## 2023-03-29 DIAGNOSIS — E78.2 MIXED HYPERLIPIDEMIA: Chronic | ICD-10-CM

## 2023-03-29 DIAGNOSIS — F41.1 GENERALIZED ANXIETY DISORDER: ICD-10-CM

## 2023-03-29 DIAGNOSIS — E11.69 MIXED HYPERLIPIDEMIA DUE TO TYPE 2 DIABETES MELLITUS: Chronic | ICD-10-CM

## 2023-03-29 DIAGNOSIS — I15.2 HYPERTENSION ASSOCIATED WITH TYPE 2 DIABETES MELLITUS: Chronic | ICD-10-CM

## 2023-03-29 PROBLEM — Z12.31 BREAST CANCER SCREENING BY MAMMOGRAM: Status: ACTIVE | Noted: 2023-03-29

## 2023-03-29 PROBLEM — F41.9 ANXIETY: Status: ACTIVE | Noted: 2023-03-29

## 2023-03-29 PROBLEM — Z53.20 MAMMOGRAM DECLINED: Status: ACTIVE | Noted: 2023-03-29

## 2023-03-29 PROBLEM — Z53.20 SCREENING FOR MALIGNANT NEOPLASM OF COLON DECLINED: Status: ACTIVE | Noted: 2023-03-29

## 2023-03-29 PROCEDURE — 1101F PT FALLS ASSESS-DOCD LE1/YR: CPT | Mod: CPTII,,, | Performed by: FAMILY MEDICINE

## 2023-03-29 PROCEDURE — 1159F PR MEDICATION LIST DOCUMENTED IN MEDICAL RECORD: ICD-10-PCS | Mod: CPTII,,, | Performed by: FAMILY MEDICINE

## 2023-03-29 PROCEDURE — 3078F PR MOST RECENT DIASTOLIC BLOOD PRESSURE < 80 MM HG: ICD-10-PCS | Mod: CPTII,,, | Performed by: FAMILY MEDICINE

## 2023-03-29 PROCEDURE — 3008F BODY MASS INDEX DOCD: CPT | Mod: CPTII,,, | Performed by: FAMILY MEDICINE

## 2023-03-29 PROCEDURE — 3044F HG A1C LEVEL LT 7.0%: CPT | Mod: CPTII,,, | Performed by: FAMILY MEDICINE

## 2023-03-29 PROCEDURE — 3074F PR MOST RECENT SYSTOLIC BLOOD PRESSURE < 130 MM HG: ICD-10-PCS | Mod: CPTII,,, | Performed by: FAMILY MEDICINE

## 2023-03-29 PROCEDURE — 3044F PR MOST RECENT HEMOGLOBIN A1C LEVEL <7.0%: ICD-10-PCS | Mod: CPTII,,, | Performed by: FAMILY MEDICINE

## 2023-03-29 PROCEDURE — 3078F DIAST BP <80 MM HG: CPT | Mod: CPTII,,, | Performed by: FAMILY MEDICINE

## 2023-03-29 PROCEDURE — 1126F AMNT PAIN NOTED NONE PRSNT: CPT | Mod: CPTII,,, | Performed by: FAMILY MEDICINE

## 2023-03-29 PROCEDURE — 3008F PR BODY MASS INDEX (BMI) DOCUMENTED: ICD-10-PCS | Mod: CPTII,,, | Performed by: FAMILY MEDICINE

## 2023-03-29 PROCEDURE — 99214 OFFICE O/P EST MOD 30 MIN: CPT | Mod: ,,, | Performed by: FAMILY MEDICINE

## 2023-03-29 PROCEDURE — 3074F SYST BP LT 130 MM HG: CPT | Mod: CPTII,,, | Performed by: FAMILY MEDICINE

## 2023-03-29 PROCEDURE — 4010F ACE/ARB THERAPY RXD/TAKEN: CPT | Mod: CPTII,,, | Performed by: FAMILY MEDICINE

## 2023-03-29 PROCEDURE — 1160F PR REVIEW ALL MEDS BY PRESCRIBER/CLIN PHARMACIST DOCUMENTED: ICD-10-PCS | Mod: CPTII,,, | Performed by: FAMILY MEDICINE

## 2023-03-29 PROCEDURE — 1126F PR PAIN SEVERITY QUANTIFIED, NO PAIN PRESENT: ICD-10-PCS | Mod: CPTII,,, | Performed by: FAMILY MEDICINE

## 2023-03-29 PROCEDURE — 1159F MED LIST DOCD IN RCRD: CPT | Mod: CPTII,,, | Performed by: FAMILY MEDICINE

## 2023-03-29 PROCEDURE — 99214 PR OFFICE/OUTPT VISIT, EST, LEVL IV, 30-39 MIN: ICD-10-PCS | Mod: ,,, | Performed by: FAMILY MEDICINE

## 2023-03-29 PROCEDURE — 1160F RVW MEDS BY RX/DR IN RCRD: CPT | Mod: CPTII,,, | Performed by: FAMILY MEDICINE

## 2023-03-29 PROCEDURE — 3288F FALL RISK ASSESSMENT DOCD: CPT | Mod: CPTII,,, | Performed by: FAMILY MEDICINE

## 2023-03-29 PROCEDURE — 4010F PR ACE/ARB THEARPY RXD/TAKEN: ICD-10-PCS | Mod: CPTII,,, | Performed by: FAMILY MEDICINE

## 2023-03-29 PROCEDURE — 1101F PR PT FALLS ASSESS DOC 0-1 FALLS W/OUT INJ PAST YR: ICD-10-PCS | Mod: CPTII,,, | Performed by: FAMILY MEDICINE

## 2023-03-29 PROCEDURE — 3288F PR FALLS RISK ASSESSMENT DOCUMENTED: ICD-10-PCS | Mod: CPTII,,, | Performed by: FAMILY MEDICINE

## 2023-03-29 RX ORDER — ALPRAZOLAM 1 MG/1
1 TABLET ORAL DAILY PRN
COMMUNITY
Start: 2023-03-25

## 2023-03-29 RX ORDER — ESCITALOPRAM OXALATE 20 MG/1
20 TABLET ORAL
COMMUNITY
Start: 2023-03-17

## 2023-03-29 RX ORDER — METFORMIN HYDROCHLORIDE 500 MG/1
500 TABLET ORAL 2 TIMES DAILY
Qty: 180 TABLET | Refills: 1 | Status: SHIPPED | OUTPATIENT
Start: 2023-03-29 | End: 2023-09-25

## 2023-03-29 RX ORDER — SIMVASTATIN 20 MG/1
20 TABLET, FILM COATED ORAL NIGHTLY
Qty: 90 TABLET | Refills: 1 | Status: SHIPPED | OUTPATIENT
Start: 2023-03-29 | End: 2023-09-25

## 2023-03-29 RX ORDER — LISINOPRIL 40 MG/1
40 TABLET ORAL DAILY
Qty: 90 TABLET | Refills: 1 | Status: SHIPPED | OUTPATIENT
Start: 2023-03-29 | End: 2023-09-25

## 2023-03-29 NOTE — PROGRESS NOTES
Subjective:      Patient ID: Shayy Burns is a 67 y.o. female.    Chief Complaint: Hypertension and Medication Refill    Patient here for DMII surveillance visit    No complaints        Problem List Items Addressed This Visit       Hypertension - Primary (Chronic)    Relevant Medications    lisinopriL (PRINIVIL,ZESTRIL) 40 MG tablet    simvastatin (ZOCOR) 20 MG tablet    Mixed hyperlipidemia (Chronic)    Relevant Medications    simvastatin (ZOCOR) 20 MG tablet    Type 2 diabetes mellitus with hyperglycemia (Chronic)    Relevant Medications    lisinopriL (PRINIVIL,ZESTRIL) 40 MG tablet    metFORMIN (GLUCOPHAGE) 500 MG tablet    simvastatin (ZOCOR) 20 MG tablet    Hypertension associated with type 2 diabetes mellitus (Chronic)    Relevant Medications    lisinopriL (PRINIVIL,ZESTRIL) 40 MG tablet    metFORMIN (GLUCOPHAGE) 500 MG tablet    simvastatin (ZOCOR) 20 MG tablet    Mixed hyperlipidemia due to type 2 diabetes mellitus (Chronic)    Relevant Medications    lisinopriL (PRINIVIL,ZESTRIL) 40 MG tablet    metFORMIN (GLUCOPHAGE) 500 MG tablet    simvastatin (ZOCOR) 20 MG tablet    Generalized anxiety disorder    Hormone replacement therapy (HRT)       The patient's Health Maintenance was reviewed and the following appears to be due:   Health Maintenance Due   Topic Date Due    Hepatitis C Screening  Never done    Diabetes Urine Screening  Never done    Foot Exam  Never done    Mammogram  Never done    DEXA Scan  Never done    Colorectal Cancer Screening  Never done       Past Medical History:  Past Medical History:   Diagnosis Date    Allergy     Diabetes mellitus, type 2     Hyperlipidemia     Hypertension     Psoriasis      Past Surgical History:   Procedure Laterality Date     SECTION      HYSTERECTOMY       Review of patient's allergies indicates:   Allergen Reactions    Bupropion hcl Rash     Current Outpatient Medications on File Prior to Visit   Medication Sig Dispense Refill    ALPRAZolam (XANAX) 1  "MG tablet Take 1 mg by mouth daily as needed.      EScitalopram oxalate (LEXAPRO) 20 MG tablet Take 20 mg by mouth.      estradioL (ESTRACE) 0.01 % (0.1 mg/gram) vaginal cream INSERT 1 GRAM VAGINALLY TWICE A WEEK      loratadine (CLARITIN) 10 mg tablet Take 10 mg by mouth once daily.      omega-3 fatty acids/fish oil (FISH OIL-OMEGA-3 FATTY ACIDS) 300-1,000 mg capsule Take 1 capsule by mouth once daily.      TALTZ AUTOINJECTOR 80 mg/mL AtIn Inject 80 mg into the skin every 28 days.      [DISCONTINUED] lisinopriL (PRINIVIL,ZESTRIL) 40 MG tablet Take 40 mg by mouth once daily.      [DISCONTINUED] metFORMIN (GLUCOPHAGE) 500 MG tablet Take 1 tablet by mouth twice daily 180 tablet 0    [DISCONTINUED] simvastatin (ZOCOR) 20 MG tablet Take 20 mg by mouth nightly.      multivitamin (THERAGRAN) per tablet Take 1 tablet by mouth once daily.      [DISCONTINUED] methylPREDNISolone (MEDROL DOSEPACK) 4 mg tablet Take 4 mg by mouth once daily. use as directed       No current facility-administered medications on file prior to visit.     Social History     Socioeconomic History    Marital status:     Number of children: 2   Occupational History    Occupation:     Tobacco Use    Smoking status: Never    Smokeless tobacco: Never   Substance and Sexual Activity    Alcohol use: Yes     Comment: 5-7 drinks per week     Drug use: Never    Sexual activity: Never     Family History   Problem Relation Age of Onset    Cancer Mother     Heart disease Mother     Parkinsonism Father        Review of Systems   All other systems reviewed and are negative.    Objective:   /74 (BP Location: Right arm, Patient Position: Sitting, BP Method: Medium (Automatic))   Pulse 95   Temp 97.6 °F (36.4 °C) (Oral)   Resp 18   Ht 5' 5" (1.651 m)   SpO2 98%   BMI 29.09 kg/m²     Physical Exam  Vitals and nursing note reviewed.   Constitutional:       Appearance: Normal appearance. She is normal weight.   HENT:      Head: " Normocephalic and atraumatic.      Right Ear: Tympanic membrane, ear canal and external ear normal.      Left Ear: Tympanic membrane, ear canal and external ear normal.      Nose: Nose normal.      Mouth/Throat:      Mouth: Mucous membranes are moist.      Pharynx: Oropharynx is clear.   Eyes:      Extraocular Movements: Extraocular movements intact.      Conjunctiva/sclera: Conjunctivae normal.      Pupils: Pupils are equal, round, and reactive to light.   Cardiovascular:      Rate and Rhythm: Normal rate and regular rhythm.      Pulses: Normal pulses.           Dorsalis pedis pulses are 2+ on the right side and 2+ on the left side.        Posterior tibial pulses are 2+ on the right side and 2+ on the left side.      Heart sounds: Normal heart sounds.   Pulmonary:      Effort: Pulmonary effort is normal.      Breath sounds: Normal breath sounds.   Abdominal:      General: Abdomen is flat. Bowel sounds are normal.      Palpations: Abdomen is soft.   Musculoskeletal:         General: Normal range of motion.      Cervical back: Normal range of motion and neck supple.      Right foot: Normal range of motion. No deformity, bunion, Charcot foot, foot drop or prominent metatarsal heads.      Left foot: Normal range of motion. No deformity, bunion, Charcot foot, foot drop or prominent metatarsal heads.   Feet:      Right foot:      Protective Sensation: 10 sites tested.  10 sites sensed.      Skin integrity: Skin integrity normal.      Toenail Condition: Right toenails are normal.      Left foot:      Protective Sensation: 10 sites tested.  10 sites sensed.      Skin integrity: Skin integrity normal.      Toenail Condition: Left toenails are normal.   Skin:     General: Skin is warm and dry.      Capillary Refill: Capillary refill takes less than 2 seconds.   Neurological:      General: No focal deficit present.      Mental Status: She is alert and oriented to person, place, and time. Mental status is at baseline.    Psychiatric:         Mood and Affect: Mood normal.         Behavior: Behavior normal.         Thought Content: Thought content normal.         Judgment: Judgment normal.   Protective Sensation (w/ 10 gram monofilament):  Right: Intact  Left: Intact    Visual Inspection:  Normal -  Bilateral    Pedal Pulses:   Right: Present  Left: Present    Posterior Tibialis Pulses:   Right:Present  Left: Present      Procedures     Orders Only on 03/10/2023   Component Date Value Ref Range Status    Color, UA 03/10/2023 YELLOW  YELLOW Final    Appearance, UA 03/10/2023 CLEAR  CLEAR Final    Specific Gravity, UA 03/10/2023 1.012  1.001 - 1.035 Final    pH, UA 03/10/2023 7.0  5.0 - 8.0 Final    Glucose, UA 03/10/2023 NEGATIVE  NEGATIVE Final    Bilirubin, UA 03/10/2023 NEGATIVE  NEGATIVE Final    Ketones, UA 03/10/2023 NEGATIVE  NEGATIVE Final    Occult Blood UA 03/10/2023 NEGATIVE  NEGATIVE Final    Protein, UA 03/10/2023 NEGATIVE  NEGATIVE Final    Nitrite, UA 03/10/2023 NEGATIVE  NEGATIVE Final    Leukocytes, UA 03/10/2023 2+ (A)  NEGATIVE Final    WBC Casts, UA 03/10/2023 0-5  < OR = 5 /HPF Final    RBC Casts, UA 03/10/2023 0-2  < OR = 2 /HPF Final    Squam Epithel, UA 03/10/2023 0-5  < OR = 5 /HPF Final    Bacteria, UA 03/10/2023 MANY (A)  NONE SEEN /HPF Final    Hyaline Casts, UA 03/10/2023 NONE SEEN  NONE SEEN /LPF Final    Service Cmt: 03/10/2023    Final    Comment: This urine was analyzed for the presence of WBC,   RBC, bacteria, casts, and other formed elements.   Only those elements seen were reported.           Documentation Only on 03/01/2023   Component Date Value Ref Range Status    Cholesterol 02/28/2023 151  0 - 200 mg/dL Final    Triglycerides 02/28/2023 127  40 - 160 mg/dL Final    LDL Calculated 02/28/2023 72  0 - 160 MG/DL Final    HDL 02/28/2023 58  35 - 70 mg/dL Final   Orders Only on 02/28/2023   Component Date Value Ref Range Status    Cholesterol 02/28/2023 151  <200 mg/dL Final    HDL 02/28/2023 58  >  OR = 50 mg/dL Final    Triglycerides 02/28/2023 127  <150 mg/dL Final    LDL Cholesterol 02/28/2023 72  mg/dL (calc) Final    Comment: Reference range: <100     Desirable range <100 mg/dL for primary prevention;    <70 mg/dL for patients with CHD or diabetic patients   with > or = 2 CHD risk factors.     LDL-C is now calculated using the Miguel   calculation, which is a validated novel method providing   better accuracy than the Friedewald equation in the   estimation of LDL-C.   Eligio HAQ et al. ALEDYA. 2013;310(19): 1172-1528   (http://education.[employer]/faq/WXO919)      HDL/Cholesterol Ratio 02/28/2023 2.6  <5.0 (calc) Final    Non HDL Chol. (LDL+VLDL) 02/28/2023 93  <130 mg/dL (calc) Final    Comment: For patients with diabetes plus 1 major ASCVD risk   factor, treating to a non-HDL-C goal of <100 mg/dL   (LDL-C of <70 mg/dL) is considered a therapeutic   option.      Glucose 02/28/2023 109 (H)  65 - 99 mg/dL Final    Comment:               Fasting reference interval     For someone without known diabetes, a glucose value  between 100 and 125 mg/dL is consistent with  prediabetes and should be confirmed with a  follow-up test.         BUN 02/28/2023 16  7 - 25 mg/dL Final    Creatinine 02/28/2023 0.62  0.50 - 1.05 mg/dL Final    eGFR 02/28/2023 98  > OR = 60 mL/min/1.73m2 Final    Comment: The eGFR is based on the CKD-EPI 2021 equation. To calculate   the new eGFR from a previous Creatinine or Cystatin C  result, go to https://www.kidney.org/professionals/  kdoqi/gfr%5Fcalculator      BUN/Creatinine Ratio 02/28/2023 NOT APPLICABLE  6 - 22 (calc) Final    Sodium 02/28/2023 139  135 - 146 mmol/L Final    Potassium 02/28/2023 4.3  3.5 - 5.3 mmol/L Final    Chloride 02/28/2023 101  98 - 110 mmol/L Final    CO2 02/28/2023 26  20 - 32 mmol/L Final    Calcium 02/28/2023 9.8  8.6 - 10.4 mg/dL Final    Total Protein 02/28/2023 6.6  6.1 - 8.1 g/dL Final    Albumin 02/28/2023 4.4  3.6 - 5.1 g/dL Final     Globulin, Total 02/28/2023 2.2  1.9 - 3.7 g/dL (calc) Final    Albumin/Globulin Ratio 02/28/2023 2.0  1.0 - 2.5 (calc) Final    Total Bilirubin 02/28/2023 0.9  0.2 - 1.2 mg/dL Final    Alkaline Phosphatase 02/28/2023 42  37 - 153 U/L Final    AST 02/28/2023 21  10 - 35 U/L Final    ALT 02/28/2023 28  6 - 29 U/L Final    WBC 02/28/2023 5.7  3.8 - 10.8 Thousand/uL Final    RBC 02/28/2023 5.28 (H)  3.80 - 5.10 Million/uL Final    Hemoglobin 02/28/2023 15.8 (H)  11.7 - 15.5 g/dL Final    Hematocrit 02/28/2023 48.0 (H)  35.0 - 45.0 % Final    MCV 02/28/2023 90.9  80.0 - 100.0 fL Final    MCH 02/28/2023 29.9  27.0 - 33.0 pg Final    MCHC 02/28/2023 32.9  32.0 - 36.0 g/dL Final    RDW 02/28/2023 12.8  11.0 - 15.0 % Final    Platelets 02/28/2023 197  140 - 400 Thousand/uL Final    MPV 02/28/2023 11.7  7.5 - 12.5 fL Final    Neutrophils, Abs 02/28/2023 2,970  1,500 - 7,800 cells/uL Final    Lymph # 02/28/2023 2,212  850 - 3,900 cells/uL Final    Mono # 02/28/2023 428  200 - 950 cells/uL Final    Eos # 02/28/2023 51  15 - 500 cells/uL Final    Baso # 02/28/2023 40  0 - 200 cells/uL Final    Neutrophils Relative 02/28/2023 52.1  % Final    Lymph % 02/28/2023 38.8  % Final    Mono % 02/28/2023 7.5  % Final    Eosinophil % 02/28/2023 0.9  % Final    Basophil % 02/28/2023 0.7  % Final    Hemoglobin A1C 02/28/2023 5.8 (H)  <5.7 % of total Hgb Final    Comment: For someone without known diabetes, a hemoglobin   A1c value between 5.7% and 6.4% is consistent with  prediabetes and should be confirmed with a   follow-up test.     For someone with known diabetes, a value <7%  indicates that their diabetes is well controlled. A1c  targets should be individualized based on duration of  diabetes, age, comorbid conditions, and other  considerations.     This assay result is consistent with an increased risk  of diabetes.     Currently, no consensus exists regarding use of  hemoglobin A1c for diagnosis of diabetes for children.         Documentation Only on 10/11/2022   Component Date Value Ref Range Status    Cholesterol 09/14/2022 134  0 - 200 mg/dL Final    HDL 09/14/2022 46  35 - 70 mg/dL Final    Triglycerides 09/14/2022 138  40 - 160 mg/dL Final    LDL Cholesterol 09/14/2022 66  mg/dL Final    Hemoglobin A1C 09/14/2022 6.2 (A)  4.0 - 6.0 % Final       No image results found.       Assessment:     1. Primary hypertension    2. Mixed hyperlipidemia    3. Type 2 diabetes mellitus with hyperglycemia, without long-term current use of insulin    4. Hypertension associated with type 2 diabetes mellitus    5. Mixed hyperlipidemia due to type 2 diabetes mellitus    6. Generalized anxiety disorder    7. Hormone replacement therapy (HRT)      Plan:   I have discontinued Shayy Burns's methylPREDNISolone. I have also changed her lisinopriL, metFORMIN, and simvastatin. Additionally, I am having her maintain her estradioL, TALTZ AUTOINJECTOR, loratadine, multivitamin, fish oil-omega-3 fatty acids, ALPRAZolam, and EScitalopram oxalate.  Problem List Items Addressed This Visit       Hypertension - Primary (Chronic)    Relevant Medications    lisinopriL (PRINIVIL,ZESTRIL) 40 MG tablet    simvastatin (ZOCOR) 20 MG tablet    Mixed hyperlipidemia (Chronic)    Relevant Medications    simvastatin (ZOCOR) 20 MG tablet    Type 2 diabetes mellitus with hyperglycemia (Chronic)    Relevant Medications    lisinopriL (PRINIVIL,ZESTRIL) 40 MG tablet    metFORMIN (GLUCOPHAGE) 500 MG tablet    simvastatin (ZOCOR) 20 MG tablet    Hypertension associated with type 2 diabetes mellitus (Chronic)    Relevant Medications    lisinopriL (PRINIVIL,ZESTRIL) 40 MG tablet    metFORMIN (GLUCOPHAGE) 500 MG tablet    simvastatin (ZOCOR) 20 MG tablet    Mixed hyperlipidemia due to type 2 diabetes mellitus (Chronic)    Relevant Medications    lisinopriL (PRINIVIL,ZESTRIL) 40 MG tablet    metFORMIN (GLUCOPHAGE) 500 MG tablet    simvastatin (ZOCOR) 20 MG tablet    Generalized anxiety  disorder    Hormone replacement therapy (HRT)     No follow-ups on file.    Shayy was seen today for hypertension and medication refill.    Diagnoses and all orders for this visit:    Primary hypertension  -     lisinopriL (PRINIVIL,ZESTRIL) 40 MG tablet; Take 1 tablet (40 mg total) by mouth once daily.  -     simvastatin (ZOCOR) 20 MG tablet; Take 1 tablet (20 mg total) by mouth nightly.   Continue current prescription medications. Refills as needed   Condition/Symptoms controlled/stable   Surveillance labs ordered as needed, or reviewed in visit.   RTC 6 months (as scheduled) or PRN      Mixed hyperlipidemia  -     simvastatin (ZOCOR) 20 MG tablet; Take 1 tablet (20 mg total) by mouth nightly.   Continue current prescription medications. Refills as needed   Condition/Symptoms controlled/stable   Surveillance labs ordered as needed, or reviewed in visit.   RTC 6 months (as scheduled) or PRN      Type 2 diabetes mellitus with hyperglycemia, without long-term current use of insulin  -     lisinopriL (PRINIVIL,ZESTRIL) 40 MG tablet; Take 1 tablet (40 mg total) by mouth once daily.  -     metFORMIN (GLUCOPHAGE) 500 MG tablet; Take 1 tablet (500 mg total) by mouth 2 (two) times daily.  -     simvastatin (ZOCOR) 20 MG tablet; Take 1 tablet (20 mg total) by mouth nightly.   Continue current prescription medications. Refills as needed   Condition/Symptoms controlled/stable   Surveillance labs ordered as needed, or reviewed in visit.   RTC 6 months (as scheduled) or PRN      Hypertension associated with type 2 diabetes mellitus  -     lisinopriL (PRINIVIL,ZESTRIL) 40 MG tablet; Take 1 tablet (40 mg total) by mouth once daily.  -     metFORMIN (GLUCOPHAGE) 500 MG tablet; Take 1 tablet (500 mg total) by mouth 2 (two) times daily.  -     simvastatin (ZOCOR) 20 MG tablet; Take 1 tablet (20 mg total) by mouth nightly.   Continue current prescription medications. Refills as needed   Condition/Symptoms  controlled/stable   Surveillance labs ordered as needed, or reviewed in visit.   RTC 6 months (as scheduled) or PRN      Mixed hyperlipidemia due to type 2 diabetes mellitus  -     lisinopriL (PRINIVIL,ZESTRIL) 40 MG tablet; Take 1 tablet (40 mg total) by mouth once daily.  -     metFORMIN (GLUCOPHAGE) 500 MG tablet; Take 1 tablet (500 mg total) by mouth 2 (two) times daily.  -     simvastatin (ZOCOR) 20 MG tablet; Take 1 tablet (20 mg total) by mouth nightly.   Continue current prescription medications. Refills as needed   Condition/Symptoms controlled/stable   Surveillance labs ordered as needed, or reviewed in visit.   RTC 6 months (as scheduled) or PRN      Generalized anxiety disorder   Continue current prescription medications. Refills as needed   Condition/Symptoms controlled/stable - sees other provider for this condition   Surveillance labs ordered as needed, or reviewed in visit.   RTC 6 months (as scheduled) or PRN    Hormone replacement therapy (HRT)   Continue current prescription medications. Refills as needed   Condition/Symptoms controlled/stable - sees other provider for this condition   Surveillance labs ordered as needed, or reviewed in visit.   RTC 6 months (as scheduled) or PRN      Medications Ordered This Encounter   Medications    lisinopriL (PRINIVIL,ZESTRIL) 40 MG tablet     Sig: Take 1 tablet (40 mg total) by mouth once daily.     Dispense:  90 tablet     Refill:  1     .    metFORMIN (GLUCOPHAGE) 500 MG tablet     Sig: Take 1 tablet (500 mg total) by mouth 2 (two) times daily.     Dispense:  180 tablet     Refill:  1    simvastatin (ZOCOR) 20 MG tablet     Sig: Take 1 tablet (20 mg total) by mouth nightly.     Dispense:  90 tablet     Refill:  1     [unfilled]  No orders of the defined types were placed in this encounter.      Medication List with Changes/Refills   Current Medications    ALPRAZOLAM (XANAX) 1 MG TABLET    Take 1 mg by mouth daily as needed.    ESCITALOPRAM OXALATE  (LEXAPRO) 20 MG TABLET    Take 20 mg by mouth.    ESTRADIOL (ESTRACE) 0.01 % (0.1 MG/GRAM) VAGINAL CREAM    INSERT 1 GRAM VAGINALLY TWICE A WEEK    LORATADINE (CLARITIN) 10 MG TABLET    Take 10 mg by mouth once daily.    MULTIVITAMIN (THERAGRAN) PER TABLET    Take 1 tablet by mouth once daily.    OMEGA-3 FATTY ACIDS/FISH OIL (FISH OIL-OMEGA-3 FATTY ACIDS) 300-1,000 MG CAPSULE    Take 1 capsule by mouth once daily.    TALTZ AUTOINJECTOR 80 MG/ML ATIN    Inject 80 mg into the skin every 28 days.   Changed and/or Refilled Medications    Modified Medication Previous Medication    LISINOPRIL (PRINIVIL,ZESTRIL) 40 MG TABLET lisinopriL (PRINIVIL,ZESTRIL) 40 MG tablet       Take 1 tablet (40 mg total) by mouth once daily.    Take 40 mg by mouth once daily.    METFORMIN (GLUCOPHAGE) 500 MG TABLET metFORMIN (GLUCOPHAGE) 500 MG tablet       Take 1 tablet (500 mg total) by mouth 2 (two) times daily.    Take 1 tablet by mouth twice daily    SIMVASTATIN (ZOCOR) 20 MG TABLET simvastatin (ZOCOR) 20 MG tablet       Take 1 tablet (20 mg total) by mouth nightly.    Take 20 mg by mouth nightly.   Discontinued Medications    METHYLPREDNISOLONE (MEDROL DOSEPACK) 4 MG TABLET    Take 4 mg by mouth once daily. use as directed      Medication List with Changes/Refills   Current Medications    ALPRAZOLAM (XANAX) 1 MG TABLET    Take 1 mg by mouth daily as needed.       Start Date: 3/25/2023 End Date: --    ESCITALOPRAM OXALATE (LEXAPRO) 20 MG TABLET    Take 20 mg by mouth.       Start Date: 3/17/2023 End Date: --    ESTRADIOL (ESTRACE) 0.01 % (0.1 MG/GRAM) VAGINAL CREAM    INSERT 1 GRAM VAGINALLY TWICE A WEEK       Start Date: 3/21/2022 End Date: --    LORATADINE (CLARITIN) 10 MG TABLET    Take 10 mg by mouth once daily.       Start Date: --        End Date: --    MULTIVITAMIN (THERAGRAN) PER TABLET    Take 1 tablet by mouth once daily.       Start Date: --        End Date: --    OMEGA-3 FATTY ACIDS/FISH OIL (FISH OIL-OMEGA-3 FATTY ACIDS)  300-1,000 MG CAPSULE    Take 1 capsule by mouth once daily.       Start Date: --        End Date: --    TALTZ AUTOINJECTOR 80 MG/ML ATIN    Inject 80 mg into the skin every 28 days.       Start Date: 7/15/2022 End Date: --   Changed and/or Refilled Medications    Modified Medication Previous Medication    LISINOPRIL (PRINIVIL,ZESTRIL) 40 MG TABLET lisinopriL (PRINIVIL,ZESTRIL) 40 MG tablet       Take 1 tablet (40 mg total) by mouth once daily.    Take 40 mg by mouth once daily.       Start Date: 3/29/2023 End Date: 9/25/2023    Start Date: 5/15/2022 End Date: 3/29/2023    METFORMIN (GLUCOPHAGE) 500 MG TABLET metFORMIN (GLUCOPHAGE) 500 MG tablet       Take 1 tablet (500 mg total) by mouth 2 (two) times daily.    Take 1 tablet by mouth twice daily       Start Date: 3/29/2023 End Date: 9/25/2023    Start Date: 12/29/2022End Date: 3/29/2023    SIMVASTATIN (ZOCOR) 20 MG TABLET simvastatin (ZOCOR) 20 MG tablet       Take 1 tablet (20 mg total) by mouth nightly.    Take 20 mg by mouth nightly.       Start Date: 3/29/2023 End Date: 9/25/2023    Start Date: 5/17/2022 End Date: 3/29/2023   Discontinued Medications    METHYLPREDNISOLONE (MEDROL DOSEPACK) 4 MG TABLET    Take 4 mg by mouth once daily. use as directed       Start Date: --        End Date: 3/29/2023

## 2023-04-05 ENCOUNTER — PATIENT OUTREACH (OUTPATIENT)
Dept: ADMINISTRATIVE | Facility: HOSPITAL | Age: 68
End: 2023-04-05
Payer: MEDICAID

## 2023-04-05 NOTE — PROGRESS NOTES
Population Health. [facility]. Reviewing patient's chart for quality metrics. I attempted to contact patient to f/u on coluard and see if she has had a recent mmg. No answer. Left message.

## 2024-05-17 ENCOUNTER — HOSPITAL ENCOUNTER (EMERGENCY)
Facility: HOSPITAL | Age: 69
Discharge: HOME | End: 2024-05-17
Attending: STUDENT IN AN ORGANIZED HEALTH CARE EDUCATION/TRAINING PROGRAM
Payer: MEDICARE

## 2024-05-17 VITALS
OXYGEN SATURATION: 98 % | BODY MASS INDEX: 27.31 KG/M2 | TEMPERATURE: 97 F | WEIGHT: 160 LBS | HEIGHT: 64 IN | SYSTOLIC BLOOD PRESSURE: 152 MMHG | DIASTOLIC BLOOD PRESSURE: 79 MMHG | RESPIRATION RATE: 16 BRPM | HEART RATE: 92 BPM
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
## 2024-05-17 DIAGNOSIS — R52 PAIN: ICD-10-CM

## 2024-05-17 DIAGNOSIS — T14.8XXA SKIN ABRASION: ICD-10-CM

## 2024-05-17 PROCEDURE — 63600175 PHARM REV CODE 636 W HCPCS: Performed by: STUDENT IN AN ORGANIZED HEALTH CARE EDUCATION/TRAINING PROGRAM

## 2024-05-17 PROCEDURE — 90471 IMMUNIZATION ADMIN: CPT | Performed by: STUDENT IN AN ORGANIZED HEALTH CARE EDUCATION/TRAINING PROGRAM

## 2024-05-17 PROCEDURE — 90715 TDAP VACCINE 7 YRS/> IM: CPT | Performed by: STUDENT IN AN ORGANIZED HEALTH CARE EDUCATION/TRAINING PROGRAM

## 2024-05-17 PROCEDURE — 99284 EMERGENCY DEPT VISIT MOD MDM: CPT | Mod: 25

## 2024-05-17 RX ORDER — MUPIROCIN 20 MG/G
OINTMENT TOPICAL 3 TIMES DAILY
Qty: 30 G | Refills: 0 | Status: SHIPPED | OUTPATIENT
Start: 2024-05-17 | End: 2024-05-24

## 2024-05-17 RX ADMIN — TETANUS TOXOID, REDUCED DIPHTHERIA TOXOID AND ACELLULAR PERTUSSIS VACCINE, ADSORBED 0.5 ML: 5; 2.5; 8; 8; 2.5 SUSPENSION INTRAMUSCULAR at 02:05

## 2024-05-17 NOTE — ED PROVIDER NOTES
Encounter Date: 2024       History     Chief Complaint   Patient presents with    Fall     Trip and fall today on [place].  Hematoma to left forehead.  Lacerations to left upper lip, left eye orbits, left cheek.  Also reports pain to right arm and chest from fall.       Patient is a 68-year-old white female past medical history of hypertension hyperlipidemia who presented to the ER today due to a fall.  She states [place] when she was talking to someone to the side of her.  She states she was not watching where she was walking and she tripped.  Patient states she fell flat on the front of her head causing a soft tissue swelling to the left side of the forehead.  She states she was a superficial abrasion over her lip as well.  She states she fell flat onto her chest but denies any other notable injuries.  She does state her right elbow is sore as well.  Denies any neck pain, back pain, abdominal pain.      Review of patient's allergies indicates:   Allergen Reactions    Bupropion hcl Rash     Past Medical History:   Diagnosis Date    Allergy     Diabetes mellitus, type 2     Hyperlipidemia     Hypertension     Psoriasis      Past Surgical History:   Procedure Laterality Date     SECTION      HYSTERECTOMY       Family History   Problem Relation Name Age of Onset    Cancer Mother      Heart disease Mother      Parkinsonism Father       Social History     Tobacco Use    Smoking status: Never    Smokeless tobacco: Never   Substance Use Topics    Alcohol use: Yes     Comment: 5-7 drinks per week     Drug use: Never     Review of Systems   Constitutional:  Negative for chills, fatigue and fever.   HENT:  Negative for congestion, sore throat and trouble swallowing.    Eyes:  Negative for pain and visual disturbance.   Respiratory:  Negative for cough, shortness of breath and wheezing.    Cardiovascular:  Negative for chest pain and palpitations.   Gastrointestinal:   Negative for abdominal pain, blood in stool, constipation, diarrhea, nausea and vomiting.   Genitourinary:  Negative for dysuria and hematuria.   Musculoskeletal:  Positive for arthralgias. Negative for back pain and myalgias.   Skin:  Positive for wound. Negative for rash.   Neurological:  Negative for seizures, syncope and headaches.   Psychiatric/Behavioral:  Negative for confusion. The patient is not nervous/anxious.        Physical Exam     Initial Vitals [05/17/24 1312]   BP Pulse Resp Temp SpO2   (!) 170/87 95 20 97.2 °F (36.2 °C) 98 %      MAP       --         Physical Exam    Nursing note and vitals reviewed.  Constitutional: She appears well-developed and well-nourished. She is not diaphoretic. No distress.   HENT:   Head: Normocephalic.   Right Ear: External ear normal.   Left Ear: External ear normal.   Nose: Nose normal.   Soft tissue swelling noted to the left side of the forehead.  No depressions in the skull palpated.  No C, T, L-spine tenderness no step-off lesions.   Eyes: Conjunctivae and EOM are normal. Right eye exhibits no discharge. Left eye exhibits no discharge. No scleral icterus.   Neck:   Normal range of motion.  Cardiovascular:  Normal rate, regular rhythm and normal heart sounds.     Exam reveals no gallop and no friction rub.       No murmur heard.  Pulmonary/Chest: Breath sounds normal. No stridor. No respiratory distress. She has no wheezes. She has no rhonchi. She has no rales.   Abdominal: Abdomen is soft. She exhibits no distension. There is no abdominal tenderness. There is no rebound.   Musculoskeletal:         General: Normal range of motion.      Cervical back: Normal range of motion.      Comments: Right elbow.  Radial pulses appreciated equal than left.  Range motion of the elbows unaffected due to pain.  No signs of trauma or deformity.  No point tenderness on exam.     Neurological: She is alert.   Skin: Skin is warm. No rash noted. No erythema.   There is a superficial  laceration measuring 3 mm just inferior to the left eyebrow.  No active bleeding.  No foreign bodies present.  Patient is opposed to laceration repair.  Wound cleaned and triple antibiotic in place.  There was also an avulsion of the skin to the lower lip.  This is superficial.  Triple antibiotic ointment in place.   Psychiatric: She has a normal mood and affect. Her behavior is normal.         ED Course   Procedures  Labs Reviewed - No data to display       Imaging Results              CT Head Without Contrast (Final result)  Result time 05/17/24 13:41:13      Final result by Johnny Jacob MD (05/17/24 13:41:13)                   Impression:      No acute intracranial abnormality identified.  Findings of chronic microvascular ischemic disease.      Electronically signed by: Johnny Jacob  Date:    05/17/2024  Time:    13:41               Narrative:    EXAMINATION:  CT HEAD WITHOUT CONTRAST    CLINICAL HISTORY:  fall;    TECHNIQUE:  Low dose axial images were obtained through the head.  Coronal and sagittal reformations were also performed. Contrast was not administered.    Automatic exposure control was utilized to reduce the patient's radiation dose.    DLP= 947    COMPARISON:  None.    FINDINGS:  No acute intracranial hemorrhage, edema or mass. No acute parenchymal abnormality.    Mild cerebral atrophy with concordant ventricular enlargement.    There is normal gray white differentiation.    Hematoma overlies the left frontal bone with no underlying fracture.    The mastoid air cells are clear.    The auditory canals are patent bilaterally.    The globes and orbital contents are normal bilaterally.    Mucosal thickening of the left maxillary sinus.                                       X-Ray Elbow Complete Right (Final result)  Result time 05/17/24 13:48:50      Final result by Johnny Jacob MD (05/17/24 13:48:50)                   Impression:      As above.  Radial head fracture not  excluded.      Electronically signed by: Johnny Jacob  Date:    05/17/2024  Time:    13:48               Narrative:    EXAMINATION:  XR ELBOW COMPLETE 3 VIEW RIGHT    CLINICAL HISTORY:  . Pain, unspecified    TECHNIQUE:  AP, lateral, and oblique views of the right elbow were performed.    COMPARISON:  None    FINDINGS:  No definite displaced fracture is appreciated.  There is irregularity of the radial head.  This may be related to degenerative change, however nondisplaced fracture is not excluded.  Small effusion is noted.                                       X-Ray Chest 1 View (Final result)  Result time 05/17/24 13:42:12      Final result by Johnny Jacob MD (05/17/24 13:42:12)                   Impression:      No acute cardiopulmonary process.      Electronically signed by: Johnny Jacob  Date:    05/17/2024  Time:    13:42               Narrative:    EXAMINATION:  XR CHEST 1 VIEW    CLINICAL HISTORY:  fall;    TECHNIQUE:  Single view of the chest    COMPARISON:  No prior imaging available for comparison.    FINDINGS:  No focal opacification, pleural effusion, or pneumothorax.    The cardiomediastinal silhouette is within normal limits.    No acute osseous abnormality.                                       Medications   Tdap (BOOSTRIX) vaccine injection 0.5 mL (has no administration in time range)     Medical Decision Making  Differentials:  Fracture, intracranial bleed, contusion, hematoma, laceration   History in his the patient   68-year-old well-appearing female presents after a fall.  It appears to have been mechanical in nature.  CT head showed no acute findings.  Superficial laceration noted a inferior to the left eyebrow.  Offered patient Dermabond versus sutures and patient states she wants neither until allow to heal by secondary intention.  Triple antibiotic ointment in place.  Boostrix given.  X-ray of the right elbow did show concern for possible radial head fracture.  Discussed this at  length with the patient which she states she does not feel that it was broken and does not want a splint.  Advised patient that if things are not splints to the if underlying fractions present they can worsened.  Patient states the pain worsens she will return to the ER for a splint.  X-ray of the chest no acute findings.  Patient denies any other complaints.  ER return precautions were discussed and follow up with PCP is recommended    Amount and/or Complexity of Data Reviewed  Radiology: ordered. Decision-making details documented in ED Course.    Risk  Prescription drug management.                                      Clinical Impression:  Final diagnoses:  [R52] Pain  [W19.XXXA] Fall, initial encounter (Primary)  [T14.8XXA] Skin abrasion          ED Disposition Condition    Discharge Stable          ED Prescriptions       Medication Sig Dispense Start Date End Date Auth. Provider    mupirocin (BACTROBAN) 2 % ointment Apply topically 3 (three) times daily. for 7 days 30 g 5/17/2024 5/24/2024 Kojo Daniel MD          Follow-up Information       Follow up With Specialties Details Why Contact Info    [facility] Emergency Medicine  If symptoms worsen [address] [facility] [id number] [phone]    Dieter Mcknight MD Family Medicine Schedule an appointment as soon as possible for a visit   [address] [place], [place] [id number] [phone]               Kojo Daniel MD  05/17/24 4318

## 2024-05-21 ENCOUNTER — HOSPITAL ENCOUNTER (EMERGENCY)
Facility: HOSPITAL | Age: 69
Discharge: HOME | End: 2024-05-21
Attending: EMERGENCY MEDICINE
Payer: MEDICARE

## 2024-05-21 VITALS
HEART RATE: 68 BPM | DIASTOLIC BLOOD PRESSURE: 83 MMHG | SYSTOLIC BLOOD PRESSURE: 146 MMHG | WEIGHT: 170 LBS | HEIGHT: 65 IN | BODY MASS INDEX: 28.32 KG/M2 | TEMPERATURE: 96 F | OXYGEN SATURATION: 97 % | RESPIRATION RATE: 18 BRPM

## 2024-05-21 DIAGNOSIS — S20.211A CONTUSION OF RIGHT CHEST WALL, INITIAL ENCOUNTER: Primary | ICD-10-CM

## 2024-05-21 PROCEDURE — 99284 EMERGENCY DEPT VISIT MOD MDM: CPT | Mod: 25

## 2024-05-21 PROCEDURE — 25000003 PHARM REV CODE 250: Performed by: EMERGENCY MEDICINE

## 2024-05-21 PROCEDURE — 96372 THER/PROPH/DIAG INJ SC/IM: CPT | Performed by: EMERGENCY MEDICINE

## 2024-05-21 PROCEDURE — 63600175 PHARM REV CODE 636 W HCPCS: Performed by: EMERGENCY MEDICINE

## 2024-05-21 RX ORDER — HYDROCODONE BITARTRATE AND ACETAMINOPHEN 5; 325 MG/1; MG/1
1 TABLET ORAL
Status: COMPLETED | OUTPATIENT
Start: 2024-05-21 | End: 2024-05-21

## 2024-05-21 RX ORDER — KETOROLAC TROMETHAMINE 30 MG/ML
30 INJECTION, SOLUTION INTRAMUSCULAR; INTRAVENOUS
Status: COMPLETED | OUTPATIENT
Start: 2024-05-21 | End: 2024-05-21

## 2024-05-21 RX ORDER — HYDROCODONE BITARTRATE AND ACETAMINOPHEN 5; 325 MG/1; MG/1
1 TABLET ORAL EVERY 6 HOURS PRN
Qty: 18 TABLET | Refills: 0 | Status: SHIPPED | OUTPATIENT
Start: 2024-05-21

## 2024-05-21 RX ADMIN — HYDROCODONE BITARTRATE AND ACETAMINOPHEN 1 TABLET: 5; 325 TABLET ORAL at 09:05

## 2024-05-21 RX ADMIN — KETOROLAC TROMETHAMINE 30 MG: 30 INJECTION, SOLUTION INTRAMUSCULAR at 09:05

## 2024-05-21 NOTE — ED PROVIDER NOTES
Encounter Date: 2024       History     Chief Complaint   Patient presents with    Shoulder Pain     Patient fell on Friday with dx of multiple contusions. Came in today for pain not any better and developed pain to right shoulder and right wrist. LOP 10/10.      Patient is a 68-year-old female presenting with complain of right sided anterior rib pain.  Patient is status post fall on this past Friday.  Patient was seen here and had x-rays done.  Patient's sustained contusions to the face, a right wrist sprain and chest wall contusion secondary to the fall.  She presents today secondary to increased pain to the right anterior chest wall.  She complains of increased pain with breathing and movement.  Patient denies neck pain or headache.      Review of patient's allergies indicates:   Allergen Reactions    Bupropion hcl Rash     Past Medical History:   Diagnosis Date    Allergy     Diabetes mellitus, type 2     Hyperlipidemia     Hypertension     Psoriasis      Past Surgical History:   Procedure Laterality Date     SECTION      HYSTERECTOMY       Family History   Problem Relation Name Age of Onset    Cancer Mother      Heart disease Mother      Parkinsonism Father       Social History     Tobacco Use    Smoking status: Never    Smokeless tobacco: Never   Substance Use Topics    Alcohol use: Yes     Comment: 5-7 drinks per week     Drug use: Never     Review of Systems   Constitutional:  Negative for chills and fever.   Respiratory:  Negative for cough, chest tightness and shortness of breath.    Cardiovascular:  Positive for chest pain. Negative for palpitations and leg swelling.   Gastrointestinal: Negative.    Genitourinary: Negative.    Musculoskeletal:  Positive for myalgias. Negative for gait problem and neck pain.   Neurological: Negative.        Physical Exam     Initial Vitals [24 0857]   BP Pulse Resp Temp SpO2   (!) 160/88 74 18 96.3 °F (35.7 °C) 98 %      MAP       --         Physical  Exam    ED Course   Procedures  Labs Reviewed - No data to display       Imaging Results              X-Ray Chest AP Portable (Final result)  Result time 05/21/24 09:31:26      Final result by Johnny Jacob MD (05/21/24 09:31:26)                   Impression:      No acute cardiopulmonary process.      Electronically signed by: Johnny Jacob  Date:    05/21/2024  Time:    09:31               Narrative:    EXAMINATION:  XR CHEST AP PORTABLE    CLINICAL HISTORY:  Fall;    TECHNIQUE:  Single view of the chest    COMPARISON:  05/17/2024    FINDINGS:  No focal opacification, pleural effusion, or pneumothorax.    The cardiomediastinal silhouette is within normal limits.    No acute osseous abnormality.                                       Medications   ketorolac injection 30 mg (30 mg Intramuscular Given 5/21/24 0917)   HYDROcodone-acetaminophen 5-325 mg per tablet 1 tablet (1 tablet Oral Given 5/21/24 0917)     Medical Decision Making  Differential diagnosis:    Rib fracture, chest wall contusion    Amount and/or Complexity of Data Reviewed  Radiology: ordered and independent interpretation performed.  Discussion of management or test interpretation with external provider(s): Patient was given a Toradol injection and Norco 5 while in the ER.  No acute changes seen on x-rays of the chest.  Will discharge to home with a prescription for Norco.  O2 sats 99% on room air.  Patient remains in    Risk  Prescription drug management.                                      Clinical Impression:  Final diagnoses:  [S20.211A] Contusion of right chest wall, initial encounter (Primary)          ED Disposition Condition    Discharge Stable          ED Prescriptions       Medication Sig Dispense Start Date End Date Auth. Provider    HYDROcodone-acetaminophen (NORCO) 5-325 mg per tablet Take 1 tablet by mouth every 6 (six) hours as needed for Pain. 18 tablet 5/21/2024 -- Kalin Price MD          Follow-up Information    None           Kalin Price MD  05/21/24 2597       Kalin Price MD  05/24/24 7734

## 2024-05-21 NOTE — Clinical Note
"Shayy Richterrodríguez Burns was seen and treated in our emergency department on 5/21/2024.  She may return to work on 05/29/2024.       If you have any questions or concerns, please don't hesitate to call.      Kalin Price MD"